# Patient Record
Sex: MALE | Race: OTHER | URBAN - METROPOLITAN AREA
[De-identification: names, ages, dates, MRNs, and addresses within clinical notes are randomized per-mention and may not be internally consistent; named-entity substitution may affect disease eponyms.]

---

## 2018-01-29 ENCOUNTER — APPOINTMENT (RX ONLY)
Dept: URBAN - METROPOLITAN AREA CLINIC 41 | Facility: CLINIC | Age: 83
Setting detail: DERMATOLOGY
End: 2018-01-29

## 2018-01-29 DIAGNOSIS — L85.3 XEROSIS CUTIS: ICD-10-CM

## 2018-01-29 DIAGNOSIS — L30.0 NUMMULAR DERMATITIS: ICD-10-CM

## 2018-01-29 DIAGNOSIS — L82.1 OTHER SEBORRHEIC KERATOSIS: ICD-10-CM

## 2018-01-29 PROCEDURE — ? TREATMENT REGIMEN

## 2018-01-29 PROCEDURE — ? COUNSELING

## 2018-01-29 PROCEDURE — 96372 THER/PROPH/DIAG INJ SC/IM: CPT

## 2018-01-29 PROCEDURE — 99203 OFFICE O/P NEW LOW 30 MIN: CPT | Mod: 25

## 2018-01-29 PROCEDURE — ? INJECTION

## 2018-01-29 ASSESSMENT — LOCATION SIMPLE DESCRIPTION DERM
LOCATION SIMPLE: LEFT BUTTOCK
LOCATION SIMPLE: RIGHT UPPER BACK
LOCATION SIMPLE: LEFT UPPER BACK

## 2018-01-29 ASSESSMENT — LOCATION DETAILED DESCRIPTION DERM
LOCATION DETAILED: RIGHT SUPERIOR UPPER BACK
LOCATION DETAILED: LEFT MID-UPPER BACK
LOCATION DETAILED: LEFT BUTTOCK
LOCATION DETAILED: RIGHT INFERIOR UPPER BACK

## 2018-01-29 ASSESSMENT — LOCATION ZONE DERM: LOCATION ZONE: TRUNK

## 2018-01-29 NOTE — PROCEDURE: TREATMENT REGIMEN
Initiate Treatment: Triamcinolone twice daily after moisturizing
Detail Level: Simple
Otc Regimen: CeraVe moisturizing cream daily

## 2018-01-29 NOTE — PROCEDURE: INJECTION
Consent: Verbal consent was obtained by patient. Risks and side effects reviewed with patient, and all of patient's questions were answered

## 2018-05-23 ENCOUNTER — APPOINTMENT (RX ONLY)
Dept: URBAN - METROPOLITAN AREA CLINIC 41 | Facility: CLINIC | Age: 83
Setting detail: DERMATOLOGY
End: 2018-05-23

## 2018-05-23 DIAGNOSIS — L98.1 FACTITIAL DERMATITIS: ICD-10-CM

## 2018-05-23 DIAGNOSIS — F45.8 OTHER SOMATOFORM DISORDERS: ICD-10-CM

## 2018-05-23 DIAGNOSIS — L82.1 OTHER SEBORRHEIC KERATOSIS: ICD-10-CM

## 2018-05-23 PROBLEM — K21.9 GASTRO-ESOPHAGEAL REFLUX DISEASE WITHOUT ESOPHAGITIS: Status: ACTIVE | Noted: 2018-05-23

## 2018-05-23 PROBLEM — J44.9 CHRONIC OBSTRUCTIVE PULMONARY DISEASE, UNSPECIFIED: Status: ACTIVE | Noted: 2018-05-23

## 2018-05-23 PROBLEM — L85.3 XEROSIS CUTIS: Status: ACTIVE | Noted: 2018-05-23

## 2018-05-23 PROCEDURE — 99213 OFFICE O/P EST LOW 20 MIN: CPT

## 2018-05-23 PROCEDURE — ? COUNSELING

## 2018-05-23 PROCEDURE — ? PRESCRIPTION

## 2018-05-23 RX ORDER — TRIAMCINOLONE ACETONIDE 1 MG/G
CREAM TOPICAL BID
Qty: 1 | Refills: 3 | Status: ERX | COMMUNITY
Start: 2018-05-23

## 2018-05-23 RX ADMIN — TRIAMCINOLONE ACETONIDE: 1 CREAM TOPICAL at 17:16

## 2018-05-23 ASSESSMENT — LOCATION DETAILED DESCRIPTION DERM
LOCATION DETAILED: LEFT MID-UPPER BACK
LOCATION DETAILED: RIGHT MEDIAL UPPER BACK
LOCATION DETAILED: RIGHT SUPERIOR MEDIAL UPPER BACK

## 2018-05-23 ASSESSMENT — LOCATION SIMPLE DESCRIPTION DERM
LOCATION SIMPLE: RIGHT UPPER BACK
LOCATION SIMPLE: LEFT UPPER BACK

## 2018-05-23 ASSESSMENT — LOCATION ZONE DERM: LOCATION ZONE: TRUNK
